# Patient Record
(demographics unavailable — no encounter records)

---

## 2025-04-10 NOTE — ASSESSMENT
[FreeTextEntry1] : My impression is that of a female who struggles with anxiety and ADHD has chronic constipation, bloating and struggles with weight control.  I have spent a great deal of time discussing the role of daily high-intensity exercise with the patient. We discussed behavior modification strategies to institute this habit.   I have discussed nutrition in great detail including consuming vegetable fibers on a daily basis and limiting simple carbohydrates 5 days per week.  We have also reviewed the benefits of soluble fiber supplementation, including (but not limited to), favorable effects on lipid profile, weight control and the salutary effects on colonic microbiota. We reviewed the effects of such daily habits on metabolism and the metabolic set point resulting in a healthy weight, decreased pain sensitivity (effecting the GI tract), bowel habits and other aspects of health.  I recommended a mindful meditation practice and reviewed behavior modification strategies.    Follow-up in 3 months to discuss progress and consider serologic workup for celiac sprue or endoscopic workup pending course.  Recommended getting a "squatty potty" and a trial of Iberogast.

## 2025-04-10 NOTE — HISTORY OF PRESENT ILLNESS
[FreeTextEntry1] : 25-year-old female patient, Karen Cota, came for a check-up and consultation on her gastrointestinal issues. - Chief Complaint (CC) : Patient reports chronic constipation, abdominal pain and bloating that has worsened in the past year. - History of Present Illness : Karen Cota, a 25-year-old female, has been experiencing chronic constipation, abdominal pain, and bloating for the past year. In the past, her bowel movements have been irregular, shifting between firm and loose stools. The constipation has primarily dominated her symptoms recently. Attempts to alleviate symptoms through various treatments including Miralax and fibrous gummies have rendered limited results. Other medications include Vyvanse 30mg for management of ADHD. Ms. Cota recently stopped taking Zoloft after observing unwanted weight gain side effect. - Past Medical History : Except for ADHD for which she is on Vyvanase 30mg, the patient denied any other significant illnesses, medical conditions, or prior hospitalizations. No previous surgeries were reported. - Past Surgical History : The patient denied any past surgical history. - Family History : - There is no mention of family history. More details are needed to provide a comprehensive family medical history.  - Social History : - Occupation and Employment: Recently graduated, currently works at the [employer] as an [employer].  - Education: Graduated from [employer] with a major in Music.  - Living Situation: Lives with parents and a pet dog.  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